# Patient Record
Sex: MALE | Race: BLACK OR AFRICAN AMERICAN | Employment: OTHER | URBAN - METROPOLITAN AREA
[De-identification: names, ages, dates, MRNs, and addresses within clinical notes are randomized per-mention and may not be internally consistent; named-entity substitution may affect disease eponyms.]

---

## 2023-02-09 ENCOUNTER — OFFICE VISIT (OUTPATIENT)
Dept: OPTOMETRY | Facility: CLINIC | Age: 28
End: 2023-02-09
Payer: COMMERCIAL

## 2023-02-09 DIAGNOSIS — H52.223 REGULAR ASTIGMATISM OF BOTH EYES: Primary | ICD-10-CM

## 2023-02-09 PROCEDURE — 92004 COMPRE OPH EXAM NEW PT 1/>: CPT | Performed by: OPTOMETRIST

## 2023-02-09 PROCEDURE — 92015 DETERMINE REFRACTIVE STATE: CPT | Performed by: OPTOMETRIST

## 2023-02-09 ASSESSMENT — REFRACTION_MANIFEST
OS_CYLINDER: +0.75
OD_SPHERE: -0.25
OD_SPHERE: +0.25
OS_CYLINDER: +0.75
OS_SPHERE: PLANO
OD_AXIS: 020
OD_CYLINDER: +0.50
OD_CYLINDER: +0.50
OS_AXIS: 164
OS_SPHERE: PLANO
OD_AXIS: 018
OS_AXIS: 165

## 2023-02-09 ASSESSMENT — CONF VISUAL FIELD
OD_INFERIOR_NASAL_RESTRICTION: 0
OS_NORMAL: 1
OD_NORMAL: 1
OS_SUPERIOR_TEMPORAL_RESTRICTION: 0
OD_SUPERIOR_TEMPORAL_RESTRICTION: 0
METHOD: COUNTING FINGERS
OD_INFERIOR_TEMPORAL_RESTRICTION: 0
OS_INFERIOR_TEMPORAL_RESTRICTION: 0
OD_SUPERIOR_NASAL_RESTRICTION: 0
OS_SUPERIOR_NASAL_RESTRICTION: 0
OS_INFERIOR_NASAL_RESTRICTION: 0

## 2023-02-09 ASSESSMENT — KERATOMETRY
OD_K2POWER_DIOPTERS: 43.75
OS_K2POWER_DIOPTERS: 43.75
OD_K1POWER_DIOPTERS: 43.25
OD_AXISANGLE2_DEGREES: 120
OS_AXISANGLE2_DEGREES: 067
OS_AXISANGLE_DEGREES: 157
OD_AXISANGLE_DEGREES: 030
OS_K1POWER_DIOPTERS: 43.25

## 2023-02-09 ASSESSMENT — VISUAL ACUITY
METHOD: SNELLEN - LINEAR
OD_SC: 20/20
OS_SC: 20/20
OD_SC: 20/20
OS_SC: 20/20

## 2023-02-09 ASSESSMENT — TONOMETRY
OS_IOP_MMHG: 16
OD_IOP_MMHG: 16
IOP_METHOD: APPLANATION

## 2023-02-09 ASSESSMENT — CUP TO DISC RATIO
OS_RATIO: 0.25
OD_RATIO: 0.25

## 2023-02-09 ASSESSMENT — EXTERNAL EXAM - LEFT EYE: OS_EXAM: NORMAL

## 2023-02-09 ASSESSMENT — EXTERNAL EXAM - RIGHT EYE: OD_EXAM: NORMAL

## 2023-02-09 ASSESSMENT — SLIT LAMP EXAM - LIDS
COMMENTS: NORMAL
COMMENTS: NORMAL

## 2023-02-09 NOTE — PATIENT INSTRUCTIONS
Astigmatism results from curvature differential in the cornea and crystalline lens which can cause a distorted image, as light rays are prevented from meeting at a common focus.    Eyeglass prescription given.    The affects of the dilating drops last for 4- 6 hours.  You will be more sensitive to light and vision will be blurry up close.  Do not drive if you do not feel comfortable.  Mydriatic sunglasses were given if needed.    Recommend annual eye exams.    Sona Hernandez O.D.  [address] [address] [phone]

## 2023-02-09 NOTE — PROGRESS NOTES
Chief Complaint   Patient presents with     Annual Eye Exam      Accompanied by self  Last Eye Exam: 1 yr  Dilated Previously: Yes, side effects of dilation explained today    What are you currently using to see?  Has worn glasses in the past.     Distance Vision Acuity: Noticed gradual change in both eyes    Near Vision Acuity: Satisfied with vision while reading  with glasses    Eye Comfort: good  Do you use eye drops? : No  Occupation or Hobbies:     [employer], OA           Medical, surgical and family histories reviewed and updated 2/9/2023.       OBJECTIVE: See Ophthalmology exam    ASSESSMENT:    ICD-10-CM    1. Regular astigmatism of both eyes - Both Eyes  H52.223           PLAN:     Patient Instructions   Astigmatism results from curvature differential in the cornea and crystalline lens which can cause a distorted image, as light rays are prevented from meeting at a common focus.    Eyeglass prescription given.    The affects of the dilating drops last for 4- 6 hours.  You will be more sensitive to light and vision will be blurry up close.  Do not drive if you do not feel comfortable.  Mydriatic sunglasses were given if needed.    Recommend annual eye exams.    Sona Hernandez O.D.  [address] [address]    [phone]

## 2023-02-09 NOTE — LETTER
2/9/2023         RE: Bobbi Ty  [address] [place] [address]        Dear Colleague,    Thank you for referring your patient, Bobbi Ty, to the [facility]. Please see a copy of my visit note below.    Chief Complaint   Patient presents with     Annual Eye Exam      Accompanied by self  Last Eye Exam: 1 yr  Dilated Previously: Yes, side effects of dilation explained today    What are you currently using to see?  Has worn glasses in the past.     Distance Vision Acuity: Noticed gradual change in both eyes    Near Vision Acuity: Satisfied with vision while reading  with glasses    Eye Comfort: good  Do you use eye drops? : No  Occupation or Hobbies:     [employer], OA           Medical, surgical and family histories reviewed and updated 2/9/2023.       OBJECTIVE: See Ophthalmology exam    ASSESSMENT:    ICD-10-CM    1. Regular astigmatism of both eyes - Both Eyes  H52.223           PLAN:     Patient Instructions   Astigmatism results from curvature differential in the cornea and crystalline lens which can cause a distorted image, as light rays are prevented from meeting at a common focus.    Eyeglass prescription given.    The affects of the dilating drops last for 4- 6 hours.  You will be more sensitive to light and vision will be blurry up close.  Do not drive if you do not feel comfortable.  Mydriatic sunglasses were given if needed.    Recommend annual eye exams.    Sona Hernandez O.D.  [facility]   [address] [place] [address]    [phone]             Again, thank you for allowing me to participate in the care of your patient.        Sincerely,        Sona Hernandez, OD

## 2023-02-17 ENCOUNTER — APPOINTMENT (OUTPATIENT)
Dept: OPTOMETRY | Facility: CLINIC | Age: 28
End: 2023-02-17
Payer: COMMERCIAL

## 2023-02-17 PROCEDURE — 92340 FIT SPECTACLES MONOFOCAL: CPT | Performed by: OPTOMETRIST

## 2024-10-03 ENCOUNTER — OFFICE VISIT (OUTPATIENT)
Dept: OPTOMETRY | Facility: CLINIC | Age: 29
End: 2024-10-03
Payer: COMMERCIAL

## 2024-10-03 DIAGNOSIS — H52.223 REGULAR ASTIGMATISM OF BOTH EYES: Primary | ICD-10-CM

## 2024-10-03 PROCEDURE — 92015 DETERMINE REFRACTIVE STATE: CPT | Performed by: OPTOMETRIST

## 2024-10-03 PROCEDURE — 99214 OFFICE O/P EST MOD 30 MIN: CPT | Performed by: OPTOMETRIST

## 2024-10-03 ASSESSMENT — REFRACTION_MANIFEST
OD_AXIS: 020
OS_AXIS: 165
OD_CYLINDER: +0.50
OD_SPHERE: +0.25
OS_CYLINDER: +0.50
OS_SPHERE: +0.00
OS_CYLINDER: +0.50
OS_SPHERE: PLANO
METHOD_AUTOREFRACTION: 1
OD_SPHERE: PLANO
OD_AXIS: 017
OD_CYLINDER: +0.50
OS_AXIS: 165

## 2024-10-03 ASSESSMENT — CONF VISUAL FIELD
OS_INFERIOR_NASAL_RESTRICTION: 0
OD_SUPERIOR_TEMPORAL_RESTRICTION: 0
OS_SUPERIOR_NASAL_RESTRICTION: 0
OS_INFERIOR_TEMPORAL_RESTRICTION: 0
OD_NORMAL: 1
OD_SUPERIOR_NASAL_RESTRICTION: 0
OD_INFERIOR_TEMPORAL_RESTRICTION: 0
OD_INFERIOR_NASAL_RESTRICTION: 0
OS_SUPERIOR_TEMPORAL_RESTRICTION: 0
OS_NORMAL: 1

## 2024-10-03 ASSESSMENT — KERATOMETRY
OD_K2POWER_DIOPTERS: 44.00
OS_K2POWER_DIOPTERS: 44.00
OD_K1POWER_DIOPTERS: 43.50
OS_K1POWER_DIOPTERS: 43.50
OD_AXISANGLE_DEGREES: 39
OD_AXISANGLE2_DEGREES: 129
OS_AXISANGLE2_DEGREES: 62
OS_AXISANGLE_DEGREES: 152

## 2024-10-03 ASSESSMENT — VISUAL ACUITY
OS_SC+: -2
OS_SC: 20/20
OD_SC: 20/20
OS_SC: 20/20
OD_SC+: -2
OD_SC: 20/20
METHOD: SNELLEN - LINEAR

## 2024-10-03 ASSESSMENT — TONOMETRY
OD_IOP_MMHG: 16
IOP_METHOD: APPLANATION
OS_IOP_MMHG: 16

## 2024-10-03 ASSESSMENT — EXTERNAL EXAM - RIGHT EYE: OD_EXAM: NORMAL

## 2024-10-03 ASSESSMENT — CUP TO DISC RATIO
OD_RATIO: 0.25
OS_RATIO: 0.25

## 2024-10-03 ASSESSMENT — EXTERNAL EXAM - LEFT EYE: OS_EXAM: NORMAL

## 2024-10-03 ASSESSMENT — REFRACTION_WEARINGRX
OS_AXIS: 165
OS_SPHERE: PLANO
OD_SPHERE: -0.25
OS_CYLINDER: +0.75
OD_AXIS: 020
OD_CYLINDER: +0.50

## 2024-10-03 ASSESSMENT — SLIT LAMP EXAM - LIDS
COMMENTS: NORMAL
COMMENTS: NORMAL

## 2024-10-03 NOTE — PROGRESS NOTES
Chief Complaint   Patient presents with    Annual Eye Exam         Last Eye Exam: 02/09/2023  Dilated Previously: Yes    What are you currently using to see?  glasses       Distance Vision Acuity: Noticed gradual change in both eyes    Near Vision Acuity: Not satisfied     Eye Comfort: occasionally good, sometimes rubbing eyes.   Do you use eye drops? : No      Denelle Mario - Optometric Assistant          Medical, surgical and family histories reviewed and updated 10/3/2024.       OBJECTIVE: See Ophthalmology exam    ASSESSMENT:  No diagnosis found.    PLAN:     There are no Patient Instructions on file for this visit.

## 2024-10-03 NOTE — PATIENT INSTRUCTIONS
Astigmatism results from curvature differential in the cornea and crystalline lens which can cause a distorted image, as light rays are prevented from meeting at a common focus.     Eyeglass prescription given.     The affects of the dilating drops last for 4- 6 hours.  You will be more sensitive to light and vision will be blurry up close.  Do not drive if you do not feel comfortable.  Mydriatic sunglasses were given if needed.     Recommend annual eye exams.     Sona Hernandez O.D.  [address] [address] [phone]

## 2024-10-03 NOTE — LETTER
10/3/2024      Bobbi Ty  [address]  [place] [address]      Dear Colleague,    Thank you for referring your patient, Bobbi Ty, to the [facility]. Please see a copy of my visit note below.    Chief Complaint   Patient presents with     Annual Eye Exam         Last Eye Exam: 02/09/2023  Dilated Previously: Yes    What are you currently using to see?  glasses       Distance Vision Acuity: Noticed gradual change in both eyes    Near Vision Acuity: Not satisfied     Eye Comfort: occasionally good, sometimes rubbing eyes.   Do you use eye drops? : No      Denelle Mario - Optometric Assistant          Medical, surgical and family histories reviewed and updated 10/3/2024.       OBJECTIVE: See Ophthalmology exam    ASSESSMENT:  No diagnosis found.    PLAN:     There are no Patient Instructions on file for this visit.       Again, thank you for allowing me to participate in the care of your patient.        Sincerely,        Sona Hernandez OD

## 2024-10-16 ENCOUNTER — APPOINTMENT (OUTPATIENT)
Dept: OPTOMETRY | Facility: CLINIC | Age: 29
End: 2024-10-16
Payer: COMMERCIAL

## 2024-10-16 PROCEDURE — 92340 FIT SPECTACLES MONOFOCAL: CPT | Performed by: OPTOMETRIST
